# Patient Record
Sex: FEMALE | Race: WHITE | Employment: OTHER | URBAN - METROPOLITAN AREA
[De-identification: names, ages, dates, MRNs, and addresses within clinical notes are randomized per-mention and may not be internally consistent; named-entity substitution may affect disease eponyms.]

---

## 2018-06-20 ENCOUNTER — APPOINTMENT (OUTPATIENT)
Dept: NUTRITION | Age: 73
End: 2018-06-20

## 2019-03-13 ENCOUNTER — APPOINTMENT (RX ONLY)
Dept: URBAN - METROPOLITAN AREA CLINIC 123 | Facility: CLINIC | Age: 74
Setting detail: DERMATOLOGY
End: 2019-03-13

## 2019-03-13 DIAGNOSIS — L663 OTHER SPECIFIED DISEASES OF HAIR AND HAIR FOLLICLES: ICD-10-CM

## 2019-03-13 DIAGNOSIS — D22 MELANOCYTIC NEVI: ICD-10-CM

## 2019-03-13 DIAGNOSIS — L82.0 INFLAMED SEBORRHEIC KERATOSIS: ICD-10-CM

## 2019-03-13 DIAGNOSIS — D18.0 HEMANGIOMA: ICD-10-CM

## 2019-03-13 DIAGNOSIS — L81.4 OTHER MELANIN HYPERPIGMENTATION: ICD-10-CM

## 2019-03-13 DIAGNOSIS — L73.9 FOLLICULAR DISORDER, UNSPECIFIED: ICD-10-CM

## 2019-03-13 DIAGNOSIS — L738 OTHER SPECIFIED DISEASES OF HAIR AND HAIR FOLLICLES: ICD-10-CM

## 2019-03-13 PROBLEM — L02.221 FURUNCLE OF ABDOMINAL WALL: Status: ACTIVE | Noted: 2019-03-13

## 2019-03-13 PROBLEM — D22.9 MELANOCYTIC NEVI, UNSPECIFIED: Status: ACTIVE | Noted: 2019-03-13

## 2019-03-13 PROBLEM — E78.5 HYPERLIPIDEMIA, UNSPECIFIED: Status: ACTIVE | Noted: 2019-03-13

## 2019-03-13 PROBLEM — D18.01 HEMANGIOMA OF SKIN AND SUBCUTANEOUS TISSUE: Status: ACTIVE | Noted: 2019-03-13

## 2019-03-13 PROBLEM — D48.5 NEOPLASM OF UNCERTAIN BEHAVIOR OF SKIN: Status: ACTIVE | Noted: 2019-03-13

## 2019-03-13 PROBLEM — F41.9 ANXIETY DISORDER, UNSPECIFIED: Status: ACTIVE | Noted: 2019-03-13

## 2019-03-13 PROBLEM — H91.90 UNSPECIFIED HEARING LOSS, UNSPECIFIED EAR: Status: ACTIVE | Noted: 2019-03-13

## 2019-03-13 PROBLEM — J30.1 ALLERGIC RHINITIS DUE TO POLLEN: Status: ACTIVE | Noted: 2019-03-13

## 2019-03-13 PROBLEM — F32.9 MAJOR DEPRESSIVE DISORDER, SINGLE EPISODE, UNSPECIFIED: Status: ACTIVE | Noted: 2019-03-13

## 2019-03-13 PROBLEM — L57.0 ACTINIC KERATOSIS: Status: ACTIVE | Noted: 2019-03-13

## 2019-03-13 PROCEDURE — ? PRESCRIPTION

## 2019-03-13 PROCEDURE — ? RECOMMENDATIONS

## 2019-03-13 PROCEDURE — 99213 OFFICE O/P EST LOW 20 MIN: CPT | Mod: 25

## 2019-03-13 PROCEDURE — ? BIOPSY BY SHAVE METHOD

## 2019-03-13 PROCEDURE — ? COUNSELING

## 2019-03-13 PROCEDURE — 11102 TANGNTL BX SKIN SINGLE LES: CPT

## 2019-03-13 PROCEDURE — ? INVENTORY

## 2019-03-13 PROCEDURE — 11103 TANGNTL BX SKIN EA SEP/ADDL: CPT

## 2019-03-13 RX ORDER — CLINDAMYCIN PHOSPHATE 10 MG/ML
LOTION TOPICAL QD
Qty: 1 | Refills: 8 | Status: ERX | COMMUNITY
Start: 2019-03-13

## 2019-03-13 RX ADMIN — CLINDAMYCIN PHOSPHATE: 10 LOTION TOPICAL at 19:28

## 2019-03-13 ASSESSMENT — LOCATION SIMPLE DESCRIPTION DERM
LOCATION SIMPLE: ABDOMEN
LOCATION SIMPLE: CHEST

## 2019-03-13 ASSESSMENT — LOCATION DETAILED DESCRIPTION DERM
LOCATION DETAILED: RIGHT LATERAL ABDOMEN
LOCATION DETAILED: LOWER STERNUM
LOCATION DETAILED: LEFT MEDIAL SUPERIOR CHEST
LOCATION DETAILED: RIGHT RIB CAGE

## 2019-03-13 ASSESSMENT — SEVERITY ASSESSMENT: SEVERITY: MILD

## 2019-03-13 ASSESSMENT — LOCATION ZONE DERM: LOCATION ZONE: TRUNK

## 2019-03-13 NOTE — PROCEDURE: BIOPSY BY SHAVE METHOD
Curettage Text: The wound bed was treated with curettage after the biopsy was performed.
Notification Instructions: Patient will be notified of biopsy results. However, patient instructed to call the office if not contacted within 2 weeks.
Biopsy Method: Dermablade
Lab: 6
Bill For Surgical Tray: no
Silver Nitrate Text: The wound bed was treated with silver nitrate after the biopsy was performed.
Detail Level: Detailed
Was A Bandage Applied: Yes
Hemostasis: Electrocautery
Cryotherapy Text: The wound bed was treated with cryotherapy after the biopsy was performed.
Lab Facility: 3
Anesthesia Type: 1% lidocaine with epinephrine
Consent: Written consent was obtained and risks were reviewed including but not limited to scarring, infection, bleeding, scabbing, incomplete removal, nerve damage and allergy to anesthesia.
Size Of Lesion In Cm: 0
Wound Care: Aquaphor
Electrodesiccation Text: The wound bed was treated with electrodesiccation after the biopsy was performed.
Depth Of Biopsy: dermis
Anesthesia Volume In Cc (Will Not Render If 0): 0.5
Dressing: bandage
Post-Care Instructions: I reviewed with the patient in detail post-care instructions. Patient is to keep the biopsy site dry overnight, and then apply bacitracin twice daily until healed. Patient may apply hydrogen peroxide soaks to remove any crusting.
Type Of Destruction Used: Curettage
Billing Type: Third-Party Bill
Biopsy Type: H and E
Electrodesiccation And Curettage Text: The wound bed was treated with electrodesiccation and curettage after the biopsy was performed.

## 2019-03-13 NOTE — PROCEDURE: RECOMMENDATIONS
Detail Level: Zone
Recommendations (Free Text): Wash with hibiclens to AA breakouts right upper abdomen/lower abdomen.

## 2019-06-28 PROBLEM — H25.011 CORTICAL AGE-RELATED CATARACT OF RIGHT EYE: Status: ACTIVE | Noted: 2019-06-28

## 2019-07-02 PROBLEM — H25.011 CORTICAL AGE-RELATED CATARACT OF RIGHT EYE: Status: RESOLVED | Noted: 2019-06-28 | Resolved: 2019-07-02

## 2019-07-11 PROBLEM — H25.012 CORTICAL AGE-RELATED CATARACT OF LEFT EYE: Status: ACTIVE | Noted: 2019-07-11

## 2019-07-15 PROBLEM — H25.012 CORTICAL AGE-RELATED CATARACT OF LEFT EYE: Status: RESOLVED | Noted: 2019-07-11 | Resolved: 2019-07-15

## 2019-12-06 ENCOUNTER — HOSPITAL ENCOUNTER (OUTPATIENT)
Dept: PHYSICAL THERAPY | Age: 74
Discharge: HOME | End: 2019-12-06
Payer: MEDICARE

## 2019-12-06 PROCEDURE — 97112 NEUROMUSCULAR REEDUCATION: CPT

## 2019-12-06 PROCEDURE — 97162 PT EVAL MOD COMPLEX 30 MIN: CPT

## 2019-12-06 NOTE — PROGRESS NOTES
PELVIC FLOOR DAILY TREATMENT NOTE 8-    Patient Name: Shayna Childers  Date:2019  : 1945  [x]  Patient  Verified  Payor: [place] MEDICARE / Plan: [place] MEDICARE PART A & B / Product Type: Medicare /    In time:10:09  Out time:11:03  Total Treatment Time (min): 54  Total Timed Codes (min): 10  1:1 Treatment Time (min): 10   Visit #: 1 of 8    Treatment Area: Full incontinence of feces [R15.9]    SUBJECTIVE  Pain Level (0-10 scale): 0  Any medication changes, allergies to medications, adverse drug reactions, diagnosis change, or new procedure performed?: [x] No    [] Yes (see summary sheet for update)  Subjective functional status/changes:   [] No changes reported  See medical history    OBJECTIVE  Modality rationale: Neuromuscular reeducation to improve the patients fecal incontinence.    Min Type Additional Details   na [] Biofeedback x na minutes    na    [] Estim: []Att   []Unatt        []TENS instruct                  []IFC  []Premod   []NMES                     []Other:  []w/US   []w/ice   []w/heat  Position:  Location:    []  Traction: [] Cervical       []Lumbar                       [] Prone          []Supine                       []Intermittent   []Continuous Lbs:  [] before manual  [] after manual    []  Ultrasound: []Continuous   [] Pulsed                           []1MHz   []3MHz Location:  W/cm2:    []  Iontophoresis with dexamethasone         Location: [] Take home patch   [] In clinic    []  Ice     []  heat  []  Ice massage Position:  Location:    []  Vasopneumatic Device Pressure:       [] lo [] med [] hi   Temperature: [] lo [] med [] hi   [] Skin assessment post-treatment:  []intact []redness- no adverse reaction       []redness  adverse reaction:     na min Therapeutic Exercise:  [x] See flow sheet :  []  Pelvic floor strengthening                []  Pelvic floor downtraining  []  Quality pelvic floor contractions      []  Relaxation techniques  []  Urge suppression exercises  [] Other:    Rationale: na to improve the patients ability to na       min Manual Therapy:    Rationale:            10 min Patient Education: [x] Review HEP    [] Progressed/Changed HEP based on: Educated Pt in pelvic floor anatomy, function/dysfunction and correct execution of a pelvic floor contraction. Reviewed biofeedback results. [] positioning   [] body mechanics   [] transfers   [] heat/ice application        Other Objective/Functional Measures:    []baseline resting tone: -   [x]slow twitch mms na   [x]fast twitch mmsna    Pain Level (0-10 scale) post treatment: 0    ASSESSMENT/Changes in Function: Justification for Eval Code Complexity:  Patient History : See POC  Examination see exam   Clinical Presentation: evolving  Clinical Decision Making : FOTO : 56 /100      Patient will continue to benefit from skilled PT services to modify and progress therapeutic interventions, address strength deficits, instruct in home and community integration and address fecal incontinence and urgency to attain remaining goals. [x]  See Plan of Care  []  See progress note/recertification  []  See Discharge Summary         Progress towards goals / Updated goals:  Initial evaluation completed with home exercise program and education initiated.       PLAN  [x]  Upgrade activities as tolerated     []  Continue plan of care  []  Update interventions per flow sheet       []  Discharge due to:_  []  Other:_      Dain Wyman PT 12/6/2019  11:03 AM      Future Appointments   Date Time Provider Martina Shearer   12/12/2019  3:30 PM Dre Chavez, PT [facility]   12/20/2019 12:45 PM Dre Chavez PT [facility]

## 2019-12-06 NOTE — PROGRESS NOTES
[address] PHYSICAL THERAPY AT [facility] [address], [address], [place] [address] - Phone: [phone]  Fax: [phone] / [phone] [facility]  Patient Name: Jossie Lyn : 1945   Medical   Diagnosis: Full incontinence of feces [R15.9] Treatment Diagnosis: Full incontinence of feces [R15.9]   Onset Date: 2019     Referral Source: Hanh Sabillon [facility]): 2019   Prior Hospitalization: See medical history Provider #: [id number]   Prior Level of Function: Chronic fecal incontinence for the past 2-3 years. Comorbidities: Cholecystectomy, Rectal polypectomy, Right THR 10/2010   Medications: Verified on Patient Summary List   The Plan of Care and following information is based on the information from the initial evaluation.   ==================================================================================  Assessment / key information: Patient is a 69 y.o. yo female  with vaginal deliveries including 1 breech birth who presents to [facility] with diagnosis of Full incontinence of feces [R15.9]. Patient reports fecal leakage occurring less than 1x weekly and occssional urinary leaking with sneezing, coughing or hard laughing. Patient wears pantishields for protection. She has had full incontinence of diarrhea in the past but recently she describes the leakage as finding fecal material on the toilet paper when she wipes herself after urinating or after a bowel movement when she feels she has wiped thoroughly. Patient presents to PT with severely impaired strength of pelvic floor muscles scoring 0/5 on MMT. On biofeedback there was low net rise fast twitch contraction at 9.64 microvolts and low net rise of slow twitch contraction at 4.05 microvolts. Patient scored 56 on FOTO/Bowel eakage indicating decreased quality of life.   Patient can benefit from PT for retraining of muscle control on biofeedback to increase pelvic floor muscle strength, decrease fecal and urinary incontinence.    ==================================================================================  Eval Complexity: History: HIGH Complexity :3+ comorbidities / personal factors will impact the outcome/ POC Exam:MEDIUM Complexity : 3 Standardized tests and measures addressing body structure, function, activity limitation and / or participation in recreation  Presentation: MEDIUM Complexity : Evolving with changing characteristics  Clinical Decision Making:MEDIUM Complexity : FOTO score of 26-74Overall Complexity:MEDIUM  Problem List: Pelvic pain/dysfunction, Decreased pelvic floor mm awareness, Decreased pelvic floor mm strength and Other  Treatment Plan may include any combination of the following: Therapeutic exercise, Neuromuscular re-education, Manual therapy, Physical agent/modality and Patient education  Patient / Family readiness to learn indicated by: asking questions, trying to perform skills and interest  Persons(s) to be included in education: patient (P)  Barriers to Learning/Limitations: yes;  sensory deficits-vision/hearing/speech  Measures taken: Speak loudly as needed   Patient Goal (s): Stop fecal leakage   Patient self reported health status: good  Rehabilitation Potential: good  Short Term Goals: To be accomplished in 4 weeks:   1. Patient performing pelvic floor exercises 3x day. 2. Patient will report 20% subjective improvement in fecal incontinence with ADLs. 3. Increase net rise of fast twitch contraction to 12 microvolts to increase continence. Long Term Goals: To be accomplished in 8 weeks:   1. Patient independent in HEP     2. Patient will increase sore on FOTO/Bowel leakage to 67 indicating improved continence and quality of life. 3. Increase net rise of fast twitch contraction to 14 microvolts to increase continence.      4. Patient will report 40% subjective improvement in fecal incontinence with ADLs. Frequency / Duration:   Patient to be seen  1  times per week for 8  weeks:  Patient / Caregiver education and instruction:Exercises  G-Codes (GP): tae    Therapist Signature: Jolynn Landin PT Date: 41/0/8656   Certification Period: na Time: 11:08 AM   ==================================================================================  I certify that the above Physical Therapy Services are being furnished while the patient is under my care. I agree with the treatment plan and certify that this therapy is necessary. Physician Signature:        Date:       Time:     Please sign and return to [facility] or you may fax the signed copy to [phone]. Thank you.

## 2019-12-12 ENCOUNTER — HOSPITAL ENCOUNTER (OUTPATIENT)
Dept: PHYSICAL THERAPY | Age: 74
Discharge: HOME | End: 2019-12-12
Payer: MEDICARE

## 2019-12-12 PROCEDURE — 97112 NEUROMUSCULAR REEDUCATION: CPT

## 2019-12-12 NOTE — PROGRESS NOTES
PELVIC FLOOR DAILY TREATMENT NOTE 8-14    Patient Name: Corie Conrad  Date:2019  : 1945  [x]  Patient  Verified  Payor: Kelly Broussard / Plan: [place] MEDICARE PART A & B / Product Type: Medicare /    In time: 3:41  Out time: 4:23  Total Treatment Time (min): 42  Total Timed Codes (min): 42  1:1 Treatment Time (min): 42   Visit #: 2 of 8    Treatment Area: Full incontinence of feces [R15.9]    SUBJECTIVE  Pain Level (0-10 scale): 0  Any medication changes, allergies to medications, adverse drug reactions, diagnosis change, or new procedure performed?: [x] No    [] Yes (see summary sheet for update)  Subjective functional status/changes:   [] No changes reported  Patient reports doing HEP 2-3x day. OBJECTIVE  Modality rationale: Neuromuscular reeducation to improve the patients fecal incontinence.    Min Type Additional Details   42 [x] Biofeedback x 42 minutes    supine surface    [] Estim: []Att   []Unatt        []TENS instruct                  []IFC  []Premod   []NMES                     []Other:  []w/US   []w/ice   []w/heat  Position:  Location:    []  Traction: [] Cervical       []Lumbar                       [] Prone          []Supine                       []Intermittent   []Continuous Lbs:  [] before manual  [] after manual    []  Ultrasound: []Continuous   [] Pulsed                           []1MHz   []3MHz Location:  W/cm2:    []  Iontophoresis with dexamethasone         Location: [] Take home patch   [] In clinic    []  Ice     []  heat  []  Ice massage Position:  Location:    []  Vasopneumatic Device Pressure:       [] lo [] med [] hi   Temperature: [] lo [] med [] hi   [x] Skin assessment post-treatment:  [x]intact []redness- no adverse reaction       []redness  adverse reaction:     na min Therapeutic Exercise:  [x] See flow sheet :  []  Pelvic floor strengthening                []  Pelvic floor downtraining  []  Quality pelvic floor contractions      []  Relaxation techniques  [] Urge suppression exercises  []  Other:    Rationale: na to improve the patients ability to na       min Manual Therapy:    Rationale:             min Patient Education: [x] Review HEP    [] Progressed/Changed HEP based on: Increase slow twitch to 5 second holds   [] positioning   [] body mechanics   [] transfers   [] heat/ice application        Other Objective/Functional Measures:    []baseline resting tone: -   [x]slow twitch mms 15.8(11.8) supine   [x]fast twitch mms 14. 9(7.28)    Pain Level (0-10 scale) post treatment: 0    ASSESSMENT/Changes in Function: Patient demonstrates improved strength of slow twitch pelvic floor muscles with fair HEP compliance. Patient will continue to benefit from skilled PT services to modify and progress therapeutic interventions, address strength deficits, instruct in home and community integration and address fecal incontinence and urgency to attain remaining goals. []  See Plan of Care  []  See progress note/recertification  []  See Discharge Summary         Progress towards goals / Updated goals:                 1. Patient performing pelvic floor exercises 3x day. Progressing                 2. Patient will report 20% subjective improvement in fecal incontinence with ADLs. 3. Increase net rise of fast twitch contraction to 12 microvolts to increase continence.  Progressing      PLAN  [x]  Upgrade activities as tolerated     []  Continue plan of care  []  Update interventions per flow sheet       []  Discharge due to:_  []  Other:_      Sarah Centeno, PT 12/12/2019   4:23 PM      Future Appointments   Date Time Provider Martina Shearer   12/20/2019 12:45 PM Idamae Holstein, PT [facility]

## 2019-12-20 ENCOUNTER — HOSPITAL ENCOUNTER (OUTPATIENT)
Dept: PHYSICAL THERAPY | Age: 74
Discharge: HOME | End: 2019-12-20
Payer: MEDICARE

## 2019-12-20 PROCEDURE — 97110 THERAPEUTIC EXERCISES: CPT

## 2019-12-20 PROCEDURE — 97112 NEUROMUSCULAR REEDUCATION: CPT

## 2019-12-20 NOTE — PROGRESS NOTES
PELVIC FLOOR DAILY TREATMENT NOTE 8-14    Patient Name: Deni Ward  Date:2019  : 1945  [x]  Patient  Verified  Payor: Chaka High / Plan: [place] MEDICARE PART A & B / Product Type: Medicare /    In time: 12:47 Out time: 1:36  Total Treatment Time (min): 49  Total Timed Codes (min): 49  1:1 Treatment Time (min): 49   Visit #: 3 of 8    Treatment Area: Full incontinence of feces [R15.9]    SUBJECTIVE  Pain Level (0-10 scale): 0  Any medication changes, allergies to medications, adverse drug reactions, diagnosis change, or new procedure performed?: [x] No    [] Yes (see summary sheet for update)  Subjective functional status/changes:   [] No changes reported  Patient reports doing HEP 2-3x day    OBJECTIVE  Modality rationale: Neuromuscular reeducation to improve the patients fecal incontinence.    Min Type Additional Details   39 [x] Biofeedback x 39 minutes    supine and seated surface    [] Estim: []Att   []Unatt        []TENS instruct                  []IFC  []Premod   []NMES                     []Other:  []w/US   []w/ice   []w/heat  Position:  Location:    []  Traction: [] Cervical       []Lumbar                       [] Prone          []Supine                       []Intermittent   []Continuous Lbs:  [] before manual  [] after manual    []  Ultrasound: []Continuous   [] Pulsed                           []1MHz   []3MHz Location:  W/cm2:    []  Iontophoresis with dexamethasone         Location: [] Take home patch   [] In clinic    []  Ice     []  heat  []  Ice massage Position:  Location:    []  Vasopneumatic Device Pressure:       [] lo [] med [] hi   Temperature: [] lo [] med [] hi   [x] Skin assessment post-treatment:  [x]intact []redness- no adverse reaction       []redness  adverse reaction:     10 min Therapeutic Exercise:  [x] See flow sheet :  []  Pelvic floor strengthening                []  Pelvic floor downtraining  []  Quality pelvic floor contractions      []  Relaxation techniques  []  Urge suppression exercises  [x]  Other:  Core strengthening    Rationale:  to improve the patients accessory muscle strength to increase fecal continence       min Manual Therapy:    Rationale:             min Patient Education: [x] Review HEP    [] Progressed/Changed HEP based on: Increase slow twitch to 7 second holds. Add core exercises 3x day   [] positioning   [] body mechanics   [] transfers   [] heat/ice application        Other Objective/Functional Measures:    []baseline resting tone: -   [x]slow twitch mms 26(18.6) supine   [x]fast twitch mms 20. 7(11.6)    Pain Level (0-10 scale) post treatment: 0    ASSESSMENT/Changes in Function: Improving PF muscle strength. Patient will continue to benefit from skilled PT services to modify and progress therapeutic interventions, address strength deficits, instruct in home and community integration and address fecal incontinence and urgency to attain remaining goals. []  See Plan of Care  []  See progress note/recertification  []  See Discharge Summary         Progress towards goals / Updated goals:                 1. Patient performing pelvic floor exercises 3x day. Progressing                 2. Patient will report 20% subjective improvement in fecal incontinence with ADLs. 3. Increase net rise of fast twitch contraction to 12 microvolts to increase continence.  Progressing      PLAN  [x]  Upgrade activities as tolerated     []  Continue plan of care  []  Update interventions per flow sheet       []  Discharge due to:_  []  Other:_      Orly Rosa, PT 12/20/2019   1:36  PM      Future Appointments   Date Time Provider Martina Shearer   1/9/2020 11:30 AM Kenzie Purchase, PT [facility]   1/16/2020 10:45 AM Kenzie Purchase, PT [facility]   1/23/2020 10:45 AM Kenzie Purchase, PT [facility]   1/30/2020 11:30 AM Kenzie Purchase, PT [facility]   2/6/2020 10:45 AM Kenzie Purchase, PT [facility]

## 2020-01-09 ENCOUNTER — HOSPITAL ENCOUNTER (OUTPATIENT)
Dept: PHYSICAL THERAPY | Age: 75
Discharge: HOME | End: 2020-01-09
Payer: MEDICARE

## 2020-01-09 PROCEDURE — 97112 NEUROMUSCULAR REEDUCATION: CPT

## 2020-01-09 NOTE — PROGRESS NOTES
[address] [place]  Phone: [phone]  Fax: [phone]  PROGRESS NOTE  Patient Name: Kurtis Sofia : 1945   Treatment/Medical Diagnosis: Full incontinence of feces [R15.9]   Referral Source: Deanne Briones,*     Date of Initial Visit: 2019 Attended Visits: 4 Missed Visits: 0   SUMMARY OF TREATMENT  PT has consisted of pelvic floor relaxation/strengthening via biofeedback, education as to pelvic floor anatomy and function, core strengthening and home exercise program.   CURRENT STATUS  Patient has made good progress in PT with short term goals fully met. Functional progress Includes patient reporting 60-70% improvement in fecal incontinence. Patient demonstrates improved pelvic floor muscle strength. Goal/Measure of Progress Goal Met? 1. Patient performing pelvic floor exercises 3x day   Status at last Eval: na Current Status: 3x day yes   2. Patient will report 20% subjective improvement in fecal incontinence with ADLs. Status at last Eval: na Current Status: 60-70% yes   3. Increase net rise of fast twitch contraction to 12 microvolts to increase continence. Status at last Eval: 9.64 Current Status: 15.4 yes   New Goals to be achieved in __4__  weeks:                1. Patient independent in HEP. 2. Patient will increase sore on FOTO/Bowel leakage to 67 indicating improved continence and quality of life. 3. Increase net rise of fast twitch contraction to 17 microvolts to increase continence. 4. Patient will report 80% subjective improvement in fecal incontinence with ADLs. RECOMMENDATIONS  Continue pelvic floor PT 1x week for 4 weeks. If you have any questions/comments please contact us directly at [phone]. Thank you for allowing us to assist in the care of your patient. Therapist Signature:  Wes Fonseca Delvis Meléndez, PT Date: 1/9/2020     Time: 12:14 PM   NOTE TO PHYSICIAN:  PLEASE COMPLETE THE ORDERS BELOW AND FAX TO   [facility] at [place]: [phone]. If you are unable to process this request in 24 hours please contact our office: [phone].    ___ I have read the above report and request that my patient continue as recommended.   ___ I have read the above report and request that my patient continue therapy with the following changes/special instructions:_________________________________________________________   ___ I have read the above report and request that my patient be discharged from therapy.      Physician Signature:        Date:       Time:

## 2020-01-09 NOTE — PROGRESS NOTES
PELVIC FLOOR DAILY TREATMENT NOTE 8-14    Patient Name: Janay Denis  Date:2020  : 1945  [x]  Patient  Verified  Payor: Mikki Linker / Plan: [place] MEDICARE PART A & B / Product Type: Medicare /    In time: 11:35 Out time: 12:15  Total Treatment Time (min): 40  Total Timed Codes (min): 40  1:1 Treatment Time (min): 40   Visit #: 4 of 8    Treatment Area: Full incontinence of feces [R15.9]    SUBJECTIVE  Pain Level (0-10 scale): 0  Any medication changes, allergies to medications, adverse drug reactions, diagnosis change, or new procedure performed?: [x] No    [] Yes (see summary sheet for update)  Subjective functional status/changes:   [] No changes reported  Patient reports doing HEP 2-3x day. 60-70% improved re fecal incontinence. She no longer has fecal material on toilet paper when she wipes but still has leakage with diarrhea. Declines core exercises secondary to nausea today after eating eggs and turkey sausages. OBJECTIVE  Modality rationale: Neuromuscular reeducation to improve the patients fecal incontinence.    Min Type Additional Details   40 [x] Biofeedback x 40 minutes    supine surface    [] Estim: []Att   []Unatt        []TENS instruct                  []IFC  []Premod   []NMES                     []Other:  []w/US   []w/ice   []w/heat  Position:  Location:    []  Traction: [] Cervical       []Lumbar                       [] Prone          []Supine                       []Intermittent   []Continuous Lbs:  [] before manual  [] after manual    []  Ultrasound: []Continuous   [] Pulsed                           []1MHz   []3MHz Location:  W/cm2:    []  Iontophoresis with dexamethasone         Location: [] Take home patch   [] In clinic    []  Ice     []  heat  []  Ice massage Position:  Location:    []  Vasopneumatic Device Pressure:       [] lo [] med [] hi   Temperature: [] lo [] med [] hi   [x] Skin assessment post-treatment:  [x]intact []redness- no adverse reaction       []redness  adverse reaction:     PD min Therapeutic Exercise:  [x] See flow sheet :  []  Pelvic floor strengthening                []  Pelvic floor downtraining  []  Quality pelvic floor contractions      []  Relaxation techniques  []  Urge suppression exercises  [x]  Other:  Core strengthening    Rationale:  to improve the patients accessory muscle strength to increase fecal continence       min Manual Therapy:    Rationale:             min Patient Education: [x] Review HEP    [] Progressed/Changed HEP based on: Increase slow twitch to 10 second holds. Add core exercises 3x day   [] positioning   [] body mechanics   [] transfers   [] heat/ice application        Other Objective/Functional Measures:    []baseline resting tone: -   [x]slow twitch mms 39.7(32.9) supine   [x]fast twitch mms 29.7(15.4)    Pain Level (0-10 scale) post treatment: 0    ASSESSMENT/Changes in Function: See PN    Patient will continue to benefit from skilled PT services to modify and progress therapeutic interventions, address strength deficits, instruct in home and community integration and address fecal incontinence and urgency to attain remaining goals.      []  See Plan of Care  []  See progress note/recertification  []  See Discharge Summary         Progress towards goals / Updated goals:                 See PN    PLAN  [x]  Upgrade activities as tolerated     []  Continue plan of care  []  Update interventions per flow sheet       []  Discharge due to:_  []  Other:_      Quinton Gonsales PT 1/9/2020   12:15 PM      Future Appointments   Date Time Provider Martina Shearer   1/16/2020 10:45 AM Nell Betancourt PT [facility]   1/23/2020 10:45 AM Nell Betancourt PT [facility]   1/30/2020 11:30 AM Nell Betancourt PT [facility]   2/6/2020 10:45 AM Nell Betancourt, PT [facility]

## 2020-01-16 ENCOUNTER — HOSPITAL ENCOUNTER (OUTPATIENT)
Dept: PHYSICAL THERAPY | Age: 75
Discharge: HOME | End: 2020-01-16
Payer: MEDICARE

## 2020-01-16 PROCEDURE — 97112 NEUROMUSCULAR REEDUCATION: CPT

## 2020-01-16 NOTE — PROGRESS NOTES
PELVIC FLOOR DAILY TREATMENT NOTE 8-14    Patient Name: Kurtis Sofia  Date:2020  : 1945  [x]  Patient  Verified  Payor: Mahogany Mccrary / Plan: [place] / Product Type: Medicare /    In time: 11:00 Out time: 11:40  Total Treatment Time (min): 40  Total Timed Codes (min):  40  1:1 Treatment Time (min): 40   Visit #: 5 of 8    Treatment Area: Full incontinence of feces [R15.9]    SUBJECTIVE  Pain Level (0-10 scale): 0  Any medication changes, allergies to medications, adverse drug reactions, diagnosis change, or new procedure performed?: [x] No    [] Yes (see summary sheet for update)  Subjective functional status/changes:   [] No changes reported  Patient reports that she was able to delay going to the bathroom after last treatment on the way home. She had diarrhea that day. OBJECTIVE  Modality rationale: Neuromuscular reeducation to improve the patients fecal incontinence.    Min Type Additional Details   40 [x] Biofeedback x 40 minutes    supine and seated surface    [] Estim: []Att   []Unatt        []TENS instruct                  []IFC  []Premod   []NMES                     []Other:  []w/US   []w/ice   []w/heat  Position:  Location:    []  Traction: [] Cervical       []Lumbar                       [] Prone          []Supine                       []Intermittent   []Continuous Lbs:  [] before manual  [] after manual    []  Ultrasound: []Continuous   [] Pulsed                           []1MHz   []3MHz Location:  W/cm2:    []  Iontophoresis with dexamethasone         Location: [] Take home patch   [] In clinic    []  Ice     []  heat  []  Ice massage Position:  Location:    []  Vasopneumatic Device Pressure:       [] lo [] med [] hi   Temperature: [] lo [] med [] hi   [x] Skin assessment post-treatment:  [x]intact []redness- no adverse reaction       []redness  adverse reaction:     na min Therapeutic Exercise:  [x] See flow sheet :  []  Pelvic floor strengthening                [] Pelvic floor downtraining  []  Quality pelvic floor contractions      []  Relaxation techniques  []  Urge suppression exercises  [x]  Other:  Core strengthening    Rationale:  to improve the patients accessory muscle strength to increase fecal continence       min Manual Therapy:    Rationale:             min Patient Education: [x] Review HEP    [] Progressed/Changed HEP based on:     Decrease slow twitch to 5 second holds secondary to patient holding breath with 10 second holds. [] positioning   [] body mechanics   [] transfers   [] heat/ice application        Other Objective/Functional Measures:    []baseline resting tone: -   [x]slow twitch mms 33.3(26.4) supine   [x]fast twitch mms 33.5(21.7)    Pain Level (0-10 scale) post treatment: 0    ASSESSMENT/Changes in Function:  Improved pelvic floor muscle strength with improved fecal continence and decreased urgency. Patient will continue to benefit from skilled PT services to modify and progress therapeutic interventions, address strength deficits, instruct in home and community integration and address fecal incontinence and urgency to attain remaining goals. []  See Plan of Care  []  See progress note/recertification  []  See Discharge Summary         Progress towards goals / Updated goals:                 1. Patient independent in HEP.  Progressing                 2. Patient will increase sore on FOTO/Bowel leakage to 67 indicating improved continence and quality of life.                 3. Increase net rise of fast twitch contraction to 17 microvolts to increase continence.  Met                 4. Patient will report 80% subjective improvement in fecal incontinence with ADLs.            PLAN  [x]  Upgrade activities as tolerated     []  Continue plan of care  []  Update interventions per flow sheet       []  Discharge due to:_  []  Other:_      Patel Bunch, PT 1/16/2020   11:40 AM      Future Appointments   Date Time Provider Martina Shearer 1/23/2020 10:45 AM Cristi Love, PT [facility]   1/30/2020 11:30 AM Cristi Love, PT [facility]   2/6/2020 10:45 AM Cristi Love, PT [facility]

## 2020-01-21 ENCOUNTER — APPOINTMENT (OUTPATIENT)
Dept: PHYSICAL THERAPY | Age: 75
End: 2020-01-21
Payer: MEDICARE

## 2020-01-23 ENCOUNTER — HOSPITAL ENCOUNTER (OUTPATIENT)
Dept: PHYSICAL THERAPY | Age: 75
Discharge: HOME | End: 2020-01-23
Payer: MEDICARE

## 2020-01-23 ENCOUNTER — APPOINTMENT (OUTPATIENT)
Dept: PHYSICAL THERAPY | Age: 75
End: 2020-01-23
Payer: MEDICARE

## 2020-01-23 PROCEDURE — 97112 NEUROMUSCULAR REEDUCATION: CPT

## 2020-01-23 NOTE — PROGRESS NOTES
PELVIC FLOOR DAILY TREATMENT NOTE 8-14    Patient Name: Tena Hand  Date:2020  : 1945  [x]  Patient  Verified  Payor: Alysha Colon / Plan: [place] MEDICARE PART A & B / Product Type: Medicare /    In time:10:55 Out time: 11:35  Total Treatment Time (min): 40  Total Timed Codes (min):  40  1:1 Treatment Time (min): 40  Visit #: 6 of 8    Treatment Area: Full incontinence of feces [R15.9]    SUBJECTIVE  Pain Level (0-10 scale): 0  Any medication changes, allergies to medications, adverse drug reactions, diagnosis change, or new procedure performed?: [x] No    [] Yes (see summary sheet for update)  Subjective functional status/changes:   [] No changes reported  Patient reports that she had pulmonary function tests. Doesn't have results yet. To see cardiologist next week 2020. OBJECTIVE  Modality rationale: Neuromuscular reeducation to improve the patients fecal incontinence.    Min Type Additional Details   40 [x] Biofeedback x 40 minutes    seated and standing surface    [] Estim: []Att   []Unatt        []TENS instruct                  []IFC  []Premod   []NMES                     []Other:  []w/US   []w/ice   []w/heat  Position:  Location:    []  Traction: [] Cervical       []Lumbar                       [] Prone          []Supine                       []Intermittent   []Continuous Lbs:  [] before manual  [] after manual    []  Ultrasound: []Continuous   [] Pulsed                           []1MHz   []3MHz Location:  W/cm2:    []  Iontophoresis with dexamethasone         Location: [] Take home patch   [] In clinic    []  Ice     []  heat  []  Ice massage Position:  Location:    []  Vasopneumatic Device Pressure:       [] lo [] med [] hi   Temperature: [] lo [] med [] hi   [x] Skin assessment post-treatment:  [x]intact []redness- no adverse reaction       []redness  adverse reaction:     na min Therapeutic Exercise:  [x] See flow sheet :  []  Pelvic floor strengthening                []  Pelvic floor downtraining  []  Quality pelvic floor contractions      []  Relaxation techniques  []  Urge suppression exercises  [x]  Other:  Core strengthening    Rationale:  to improve the patients accessory muscle strength to increase fecal continence       min Manual Therapy:    Rationale:             min Patient Education: [x] Review HEP    [] Progressed/Changed HEP based on:  2x day seated, 1x day standing       [] positioning   [] body mechanics   [] transfers   [] heat/ice application        Other Objective/Functional Measures:    []baseline resting tone: -   [x]slow twitch mms 21.6(14) seated   [x]fast twitch mms 23.3(12.4)    Pain Level (0-10 scale) post treatment: 0    ASSESSMENT/Changes in Function:  Worked with patient on coordinating breathing with PF exercises so she does not hold her breath    Patient will continue to benefit from skilled PT services to modify and progress therapeutic interventions, address strength deficits, instruct in home and community integration and address fecal incontinence and urgency to attain remaining goals. []  See Plan of Care  []  See progress note/recertification  []  See Discharge Summary         Progress towards goals / Updated goals:                 1. Patient independent in HEP.  Progressing                 2. Patient will increase sore on FOTO/Bowel leakage to 67 indicating improved continence and quality of life.                 3. Increase net rise of fast twitch contraction to 17 microvolts to increase continence.  Met                 4. Patient will report 80% subjective improvement in fecal incontinence with ADLs.            PLAN  [x]  Upgrade activities as tolerated     []  Continue plan of care  []  Update interventions per flow sheet       []  Discharge due to:_  []  Other:_      Quinton Gonsales PT 1/23/2020   11:35 AM      Future Appointments   Date Time Provider Martina Shearer   2/6/2020 10:45 AM Nell Betancourt PT [facility]   2/20/2020 10:45 AM Marshall Gitelman, PT [facility]

## 2020-01-28 ENCOUNTER — APPOINTMENT (OUTPATIENT)
Dept: PHYSICAL THERAPY | Age: 75
End: 2020-01-28
Payer: MEDICARE

## 2020-01-30 ENCOUNTER — APPOINTMENT (OUTPATIENT)
Dept: PHYSICAL THERAPY | Age: 75
End: 2020-01-30
Payer: MEDICARE

## 2020-02-06 ENCOUNTER — HOSPITAL ENCOUNTER (OUTPATIENT)
Dept: PHYSICAL THERAPY | Age: 75
Discharge: HOME | End: 2020-02-06
Payer: MEDICARE

## 2020-02-06 PROCEDURE — 97112 NEUROMUSCULAR REEDUCATION: CPT

## 2020-02-06 NOTE — PROGRESS NOTES
PELVIC FLOOR DAILY TREATMENT NOTE 8-14    Patient Name: Venice Yao  Date:2020  : 1945  [x]  Patient  Verified  Payor: Lilliana Navas / Plan: [place] MEDICARE PART A & B / Product Type: Medicare /    In time:10:59 Out time: 11:39  Total Treatment Time (min): 40  Total Timed Codes (min):  40  1:1 Treatment Time (min): 40  Visit #: 7 of 8    Treatment Area: Full incontinence of feces [R15.9]    SUBJECTIVE  Pain Level (0-10 scale): 0  Any medication changes, allergies to medications, adverse drug reactions, diagnosis change, or new procedure performed?: [x] No    [] Yes (see summary sheet for update)  Subjective functional status/changes:   [] No changes reported  Patient reports that she saw a cardiologist who said that her heart is strong. He is sending her to a pulmonologist due to SOB. Sort of did her PF HEP. OBJECTIVE  Modality rationale: Neuromuscular reeducation to improve the patients fecal incontinence.    Min Type Additional Details   40 [x] Biofeedback x 40 minutes    seated and standing surface    [] Estim: []Att   []Unatt        []TENS instruct                  []IFC  []Premod   []NMES                     []Other:  []w/US   []w/ice   []w/heat  Position:  Location:    []  Traction: [] Cervical       []Lumbar                       [] Prone          []Supine                       []Intermittent   []Continuous Lbs:  [] before manual  [] after manual    []  Ultrasound: []Continuous   [] Pulsed                           []1MHz   []3MHz Location:  W/cm2:    []  Iontophoresis with dexamethasone         Location: [] Take home patch   [] In clinic    []  Ice     []  heat  []  Ice massage Position:  Location:    []  Vasopneumatic Device Pressure:       [] lo [] med [] hi   Temperature: [] lo [] med [] hi   [x] Skin assessment post-treatment:  [x]intact []redness- no adverse reaction       []redness  adverse reaction:     na min Therapeutic Exercise:  [x] See flow sheet :  []  Pelvic floor strengthening                []  Pelvic floor downtraining  []  Quality pelvic floor contractions      []  Relaxation techniques  []  Urge suppression exercises  [x]  Other:  Core strengthening    Rationale:  to improve the patients accessory muscle strength to increase fecal continence       min Manual Therapy:    Rationale:             min Patient Education: [x] Review HEP    [] Progressed/Changed HEP based on:  2x day standing, 1x day seated       [] positioning   [] body mechanics   [] transfers   [] heat/ice application        Other Objective/Functional Measures:    []baseline resting tone: -   [x]slow twitch mms 28.7(23.1) standing   [x]fast twitch mms 29.3(17.7)    Pain Level (0-10 scale) post treatment: 0    ASSESSMENT/Changes in Function:  Patient demonstrates improved strength of pelvic floor muscles. Patient will continue to benefit from skilled PT services to modify and progress therapeutic interventions, address strength deficits, instruct in home and community integration and address fecal incontinence and urgency to attain remaining goals. []  See Plan of Care  []  See progress note/recertification  []  See Discharge Summary         Progress towards goals / Updated goals:                 1. Patient independent in HEP.  Progressing                 2. Patient will increase sore on FOTO/Bowel leakage to 67 indicating improved continence and quality of life.                 3. Increase net rise of fast twitch contraction to 17 microvolts to increase continence.  Met                 4. Patient will report 80% subjective improvement in fecal incontinence with ADLs.            PLAN  [x]  Upgrade activities as tolerated     []  Continue plan of care  []  Update interventions per flow sheet       []  Discharge due to:_  []  Other:_      Liborio Khan, PT 2/6/2020   11:39 AM      Future Appointments   Date Time Provider Martina Shearer   2/20/2020 10:45 AM Chandu Valentino, PT [facility]

## 2020-02-20 ENCOUNTER — APPOINTMENT (OUTPATIENT)
Dept: PHYSICAL THERAPY | Age: 75
End: 2020-02-20
Payer: MEDICARE

## 2020-03-03 ENCOUNTER — APPOINTMENT (OUTPATIENT)
Dept: PHYSICAL THERAPY | Age: 75
End: 2020-03-03
Payer: MEDICARE

## 2020-03-06 ENCOUNTER — HOSPITAL ENCOUNTER (OUTPATIENT)
Dept: PHYSICAL THERAPY | Age: 75
Discharge: HOME | End: 2020-03-06
Payer: MEDICARE

## 2020-03-06 PROCEDURE — 97112 NEUROMUSCULAR REEDUCATION: CPT

## 2020-03-06 PROCEDURE — 97110 THERAPEUTIC EXERCISES: CPT

## 2020-03-06 NOTE — PROGRESS NOTES
PELVIC FLOOR DAILY TREATMENT NOTE 8-14    Patient Name: Kurtis Sofia  Date:3/6/2020  : 1945  [x]  Patient  Verified  Payor: Mahogany Mccrary / Plan: [place] MEDICARE PART A & B / Product Type: Medicare /    In time: 1:00 Out time: 1:44  Total Treatment Time (min): 44  Total Timed Codes (min):  44  1:1 Treatment Time (min):  44  Visit #: 8 of 8    Treatment Area: Full incontinence of feces [R15.9]    SUBJECTIVE  Pain Level (0-10 scale): 0  Any medication changes, allergies to medications, adverse drug reactions, diagnosis change, or new procedure performed?: [x] No    [] Yes (see summary sheet for update)  Subjective functional status/changes:   [] No changes reported  Patient reports a lot of stress secondary to exhusband dying. Inconsistent with HEP. Had a fecal accident but it was a small volume. 80-90% improved overall. OBJECTIVE  Modality rationale: Neuromuscular reeducation to improve the patients fecal incontinence.    Min Type Additional Details   34 [x] Biofeedback x 34 minutes    seated and standing surface    [] Estim: []Att   []Unatt        []TENS instruct                  []IFC  []Premod   []NMES                     []Other:  []w/US   []w/ice   []w/heat  Position:  Location:    []  Traction: [] Cervical       []Lumbar                       [] Prone          []Supine                       []Intermittent   []Continuous Lbs:  [] before manual  [] after manual    []  Ultrasound: []Continuous   [] Pulsed                           []1MHz   []3MHz Location:  W/cm2:    []  Iontophoresis with dexamethasone         Location: [] Take home patch   [] In clinic    []  Ice     []  heat  []  Ice massage Position:  Location:    []  Vasopneumatic Device Pressure:       [] lo [] med [] hi   Temperature: [] lo [] med [] hi   [x] Skin assessment post-treatment:  [x]intact []redness- no adverse reaction       []redness  adverse reaction:     na min Therapeutic Exercise:  [x] See flow sheet :  []  Pelvic floor strengthening                []  Pelvic floor downtraining  []  Quality pelvic floor contractions      []  Relaxation techniques  []  Urge suppression exercises  [x]  Other:  Core strengthening    Rationale:  to improve the patients accessory muscle strength to increase fecal continence       min Manual Therapy:    Rationale:            10 min Patient Education: [x] Review HEP    [] Progressed/Changed HEP based on:  D/C instructions       [] positioning   [] body mechanics   [] transfers   [] heat/ice application        Other Objective/Functional Measures:    []baseline resting tone: -   [x]slow twitch mms 30.1(25) seated   [x]fast twitch mms 27.4(17.5)     Pain Level (0-10 scale) post treatment: 0    ASSESSMENT/Changes in Function:  See D/C note    Patient will continue to benefit from skilled PT services to modify and progress therapeutic interventions, address strength deficits, instruct in home and community integration and address fecal incontinence and urgency to attain remaining goals. []  See Plan of Care  []  See progress note/recertification  [x]  See Discharge Summary         Progress towards goals / Updated goals:                 See D/C note           PLAN  []  Upgrade activities as tolerated     []  Continue plan of care  []  Update interventions per flow sheet       [x]  Discharge due to:_program completed  []  Other:_      Becca Hilario, PT 3/6/2020   1:44 PM      No future appointments.

## 2020-03-06 NOTE — PROGRESS NOTES
[address] PHYSICAL THERAPY  [address] [facility] [place] - Phone: [phone]  Fax: [phone] SUMMARY FOR PHYSICAL THERAPY          Patient Name: Eduardo Gallardo : 1945   Treatment/Medical Diagnosis: Full incontinence of feces [R15.9]   Onset Date: 2019    Referral Source: Dakotah Bryan [facility]): 2019   Prior Hospitalization: See Medical History Provider #: [id number]   Prior Level of Function: Chronic fecal incontinence for the past 2-3 years. Comorbidities: Cholecystectomy, Rectal polypectomy, Right THR 10/2010   Medications: Verified on Patient Summary List   Vis[facility] from [facility]: 8 Missed Visits: 1     Goal/Measure of Progress Goal Met? 1. Patient independent in home exercise program.   Status at last Eval: progressing Current Status: Pt independent yes   2. Patient will  increase score  on FOTO/Bowel leakage to 67 indicating improved continence and quality of life. Status at last Eval: 56 Current Status: 61 progressing   3. Increase net rise of fast twitch contraction to 17 microvolts to increase continence. Status at last Eval: 9.64 at initial  15.4 at midpoint Current Status: 17.5 yes   4. Patient will report 80% subjective improvement in fecal incontinence with ADLs.     Status at last Eval: 60-70% Current Status: 80% yes     Key Functional Changes/Progress: Patient has made good progress with PT demonstrating improved pelvic floor muscle strength and improved continence. Patient to continue on home exercise program.    G-Codes (GP): na  Assessments/Recommendations: Discontinue therapy. Progressing towards or have reached established goals. If you have any questions/comments please contact us directly at  [phone]. Thank you for allowing us to assist in the care of your patient. Therapist Signature:  Claude Bergman PT Date: 3/6/2020   Reporting Period: 2020 to 3/6/2020 Time: 1:44 PM